# Patient Record
Sex: MALE
[De-identification: names, ages, dates, MRNs, and addresses within clinical notes are randomized per-mention and may not be internally consistent; named-entity substitution may affect disease eponyms.]

---

## 2018-01-21 ENCOUNTER — HOSPITAL ENCOUNTER (INPATIENT)
Dept: HOSPITAL 92 - ERS | Age: 63
LOS: 2 days | Discharge: HOME | DRG: 247 | End: 2018-01-23
Attending: INTERNAL MEDICINE | Admitting: INTERNAL MEDICINE
Payer: COMMERCIAL

## 2018-01-21 VITALS — BODY MASS INDEX: 27.4 KG/M2

## 2018-01-21 DIAGNOSIS — I10: ICD-10-CM

## 2018-01-21 DIAGNOSIS — I48.91: ICD-10-CM

## 2018-01-21 DIAGNOSIS — F17.210: ICD-10-CM

## 2018-01-21 DIAGNOSIS — Z88.0: ICD-10-CM

## 2018-01-21 DIAGNOSIS — I25.10: ICD-10-CM

## 2018-01-21 DIAGNOSIS — I21.09: Primary | ICD-10-CM

## 2018-01-21 LAB
ALBUMIN SERPL BCG-MCNC: 3.8 G/DL (ref 3.5–5)
ALP SERPL-CCNC: 54 U/L (ref 40–150)
ALT SERPL W P-5'-P-CCNC: 16 U/L (ref 8–55)
ANION GAP SERPL CALC-SCNC: 14 MMOL/L (ref 10–20)
APTT PPP: (no result) SEC (ref 22.9–36.1)
AST SERPL-CCNC: 14 U/L (ref 5–34)
BASOPHILS # BLD AUTO: 0 THOU/UL (ref 0–0.2)
BASOPHILS NFR BLD AUTO: 0.4 % (ref 0–1)
BILIRUB SERPL-MCNC: 0.6 MG/DL (ref 0.2–1.2)
BUN SERPL-MCNC: 15 MG/DL (ref 8.4–25.7)
CALCIUM SERPL-MCNC: 8.5 MG/DL (ref 7.8–10.44)
CHLORIDE SERPL-SCNC: 98 MMOL/L (ref 98–107)
CK MB SERPL-MCNC: 12.1 NG/ML (ref 0–6.6)
CK MB SERPL-MCNC: 3.8 NG/ML (ref 0–6.6)
CK MB SERPL-MCNC: 35 NG/ML (ref 0–6.6)
CO2 SERPL-SCNC: 20 MMOL/L (ref 22–29)
CREAT CL PREDICTED SERPL C-G-VRATE: 0 ML/MIN (ref 70–130)
EOSINOPHIL # BLD AUTO: 0.2 THOU/UL (ref 0–0.7)
EOSINOPHIL NFR BLD AUTO: 3.1 % (ref 0–10)
GLOBULIN SER CALC-MCNC: 2.2 G/DL (ref 2.4–3.5)
GLUCOSE SERPL-MCNC: 156 MG/DL (ref 70–105)
HGB BLD-MCNC: 14.7 G/DL (ref 14–18)
INR PPP: 1.2
LYMPHOCYTES # BLD: 1.5 THOU/UL (ref 1.2–3.4)
LYMPHOCYTES NFR BLD AUTO: 20.4 % (ref 21–51)
MCH RBC QN AUTO: 29.2 PG (ref 27–31)
MCV RBC AUTO: 87.8 FL (ref 80–94)
MONOCYTES # BLD AUTO: 0.6 THOU/UL (ref 0.11–0.59)
MONOCYTES NFR BLD AUTO: 8.4 % (ref 0–10)
NEUTROPHILS # BLD AUTO: 4.8 THOU/UL (ref 1.4–6.5)
NEUTROPHILS NFR BLD AUTO: 67.7 % (ref 42–75)
PLATELET # BLD AUTO: 178 THOU/UL (ref 130–400)
POTASSIUM SERPL-SCNC: 3.2 MMOL/L (ref 3.5–5.1)
PROTHROMBIN TIME: 15 SEC (ref 12–14.7)
RBC # BLD AUTO: 5.04 MILL/UL (ref 4.7–6.1)
SODIUM SERPL-SCNC: 129 MMOL/L (ref 136–145)
TROPONIN I SERPL DL<=0.01 NG/ML-MCNC: 0.05 NG/ML (ref ?–0.03)
TROPONIN I SERPL DL<=0.01 NG/ML-MCNC: 1.28 NG/ML (ref ?–0.03)
TROPONIN I SERPL DL<=0.01 NG/ML-MCNC: 3.8 NG/ML (ref ?–0.03)
WBC # BLD AUTO: 7.1 THOU/UL (ref 4.8–10.8)

## 2018-01-21 PROCEDURE — 4A023N7 MEASUREMENT OF CARDIAC SAMPLING AND PRESSURE, LEFT HEART, PERCUTANEOUS APPROACH: ICD-10-PCS | Performed by: INTERNAL MEDICINE

## 2018-01-21 PROCEDURE — 85025 COMPLETE CBC W/AUTO DIFF WBC: CPT

## 2018-01-21 PROCEDURE — 84481 FREE ASSAY (FT-3): CPT

## 2018-01-21 PROCEDURE — G0009 ADMIN PNEUMOCOCCAL VACCINE: HCPCS

## 2018-01-21 PROCEDURE — 83735 ASSAY OF MAGNESIUM: CPT

## 2018-01-21 PROCEDURE — 86900 BLOOD TYPING SEROLOGIC ABO: CPT

## 2018-01-21 PROCEDURE — 84439 ASSAY OF FREE THYROXINE: CPT

## 2018-01-21 PROCEDURE — B2151ZZ FLUOROSCOPY OF LEFT HEART USING LOW OSMOLAR CONTRAST: ICD-10-PCS | Performed by: INTERNAL MEDICINE

## 2018-01-21 PROCEDURE — C1769 GUIDE WIRE: HCPCS

## 2018-01-21 PROCEDURE — 93798 PHYS/QHP OP CAR RHAB W/ECG: CPT

## 2018-01-21 PROCEDURE — 80061 LIPID PANEL: CPT

## 2018-01-21 PROCEDURE — 99153 MOD SED SAME PHYS/QHP EA: CPT

## 2018-01-21 PROCEDURE — 90471 IMMUNIZATION ADMIN: CPT

## 2018-01-21 PROCEDURE — 85347 COAGULATION TIME ACTIVATED: CPT

## 2018-01-21 PROCEDURE — 93306 TTE W/DOPPLER COMPLETE: CPT

## 2018-01-21 PROCEDURE — 027034Z DILATION OF CORONARY ARTERY, ONE ARTERY WITH DRUG-ELUTING INTRALUMINAL DEVICE, PERCUTANEOUS APPROACH: ICD-10-PCS | Performed by: INTERNAL MEDICINE

## 2018-01-21 PROCEDURE — C9600 PERC DRUG-EL COR STENT SING: HCPCS

## 2018-01-21 PROCEDURE — 93010 ELECTROCARDIOGRAM REPORT: CPT

## 2018-01-21 PROCEDURE — 83880 ASSAY OF NATRIURETIC PEPTIDE: CPT

## 2018-01-21 PROCEDURE — B2111ZZ FLUOROSCOPY OF MULTIPLE CORONARY ARTERIES USING LOW OSMOLAR CONTRAST: ICD-10-PCS | Performed by: INTERNAL MEDICINE

## 2018-01-21 PROCEDURE — 92921: CPT

## 2018-01-21 PROCEDURE — 96374 THER/PROPH/DIAG INJ IV PUSH: CPT

## 2018-01-21 PROCEDURE — 90732 PPSV23 VACC 2 YRS+ SUBQ/IM: CPT

## 2018-01-21 PROCEDURE — 85730 THROMBOPLASTIN TIME PARTIAL: CPT

## 2018-01-21 PROCEDURE — 36415 COLL VENOUS BLD VENIPUNCTURE: CPT

## 2018-01-21 PROCEDURE — 86901 BLOOD TYPING SEROLOGIC RH(D): CPT

## 2018-01-21 PROCEDURE — 80053 COMPREHEN METABOLIC PANEL: CPT

## 2018-01-21 PROCEDURE — 82553 CREATINE MB FRACTION: CPT

## 2018-01-21 PROCEDURE — 85610 PROTHROMBIN TIME: CPT

## 2018-01-21 PROCEDURE — 86850 RBC ANTIBODY SCREEN: CPT

## 2018-01-21 PROCEDURE — 71045 X-RAY EXAM CHEST 1 VIEW: CPT

## 2018-01-21 PROCEDURE — 84443 ASSAY THYROID STIM HORMONE: CPT

## 2018-01-21 PROCEDURE — 93005 ELECTROCARDIOGRAM TRACING: CPT

## 2018-01-21 PROCEDURE — 92928 PRQ TCAT PLMT NTRAC ST 1 LES: CPT

## 2018-01-21 PROCEDURE — 84484 ASSAY OF TROPONIN QUANT: CPT

## 2018-01-21 PROCEDURE — 99152 MOD SED SAME PHYS/QHP 5/>YRS: CPT

## 2018-01-21 PROCEDURE — 93458 L HRT ARTERY/VENTRICLE ANGIO: CPT

## 2018-01-21 PROCEDURE — C1874 STENT, COATED/COV W/DEL SYS: HCPCS

## 2018-01-21 RX ADMIN — AMIODARONE HYDROCHLORIDE SCH MLS: 50 INJECTION, SOLUTION INTRAVENOUS at 15:56

## 2018-01-21 RX ADMIN — TICAGRELOR SCH MG: 90 TABLET ORAL at 20:52

## 2018-01-21 NOTE — RAD
SINGLE VIEW OF THE CHEST:

 

Comparison: 1-31-06

 

History: Status post interventional cardiology. Stent placement. 

 

FINDINGS:

Single view of the chest shows a normal sized cardiomediastinal silhouette. There is no evidence of c
onsolidation, mass, or pleural effusion. The bones are unremarkable.

 

IMPRESSION:

No evidence of acute cardiopulmonary disease.

 

POS: H

## 2018-01-21 NOTE — HP
DATE OF SERVICE:  2018

 

CHIEF COMPLAINT:  Chest pain, anterior MI.

 

HISTORY OF PRESENT ILLNESS:  Mr. Pedraza is a very pleasant 62-year-old  gentleman who comes to 
the hospital with chest pain.  He was at home taking a shower, came out of the shower.  He is getting
 ready to go to a  of his brother's friend and he developed sudden onset of chest tightness.  
He did not felt anything like this but he immediately called 911 and he was brought into the hospital
 immediately.  En route, an EKG was done and that showed anterior ST elevations, so the STEMI pager w
as activated and we were called emergently.  He states that by the time I saw him in the ER, he had b
een having pain for about 30 minutes at that point.  He was taken emergently to catheterization lab w
here he was found to have an occluded LAD at a bifurcation with a large diagonal.  We wired the LAD. 
 Balloon did open and restored flow.  His pain got better, still not completely gone, but better.  We
 then called Surgery as there was a possibility that we may need to vascularize him with open heart a
s he had a complex lesion in the proximal LAD; however, he continued to occlude the artery, so we dec
ided to go ahead and try to intervene with a stent.  We then were able to place a stent across the la
rge diagonal and we were able to balloon, open the ostium of the diagonal with decent results.  His p
ain was much better.  During all this, he was monitored.  He was in atrial fibrillation with rapid ve
ntricular response.  His blood pressure was high; however, when the pain was better controlled, his b
lood pressure went down.  His heart rate actually came down to the 90s-110s.  He did well.  He was st
arted on Aggrastat as well given his continued thrombus formation.  He also had distal embolization t
o a diagonal, which was product with a balloon; however, the distal portion of this diagonal was stil
l occluded towards the end of the procedure; it did not improve with adenosine.  He was not having an
y more pain towards the end.  He just had a discomfort.  He was transferred to the ICU.

 

PAST MEDICAL HISTORY:  None.

 

SOCIAL HISTORY:  He smokes.  He states about 2-4 cigarettes a day and he has been trying to quit for 
a long time.  No alcohol or drugs.

 

FAMILY HISTORY:  Noncontributory.

 

MEDICATIONS:  None per patient's report.

 

ALLERGIES:  PENICILLIN.

 

REVIEW OF SYSTEMS:  A 12-point review of system was done and is all negative unless stated in the his
tory of present illness.

 

PHYSICAL EXAMINATION:

VITAL SIGNS:  Temperature 97.2, pulse 118, respiratory rate 20, satting 93% on room air and blood pre
ssure 124/77 on catheterization.

GENERAL:  Awake, alert and oriented x3, groaning and moaning while in pain, better now.  He has calme
d down.

HEENT:  Normocephalic and atraumatic.

NECK:  Supple.

LUNGS:  Clear.

CARDIOVASCULAR:  S1 and S2.  No S3, S4.  No murmurs or rubs.

ABDOMEN:  Soft.  Positive bowel sounds.

EXTREMITIES:  No edema.

SKIN:  Warm and dry.

 

LABORATORY WORK:  Reviewed a sodium of 129, potassium 3.2, chloride of 98, carbon dioxide of 20, anio
n gap of 14, BUN of 15, creatinine is 0.82, GFR greater than 90, glucose of 156, calcium of 8.5, tota
l bilirubin 0.6.  AST, ALT, and alkaline phosphatase are normal.  CK-MB initially at 3.8, troponin 0.
052, albumin of 3.8, globulin 2.2.  CBC with a white count 7.1, hemoglobin 14, hematocrit 44 and plat
elet count 178.

 

IMAGING DATA:  EKG has reviewed anterior ST elevations.

 

ASSESSMENT:

1.  Acute anterior ST elevation myocardial infarction.

2.  Atrial fibrillation and rapid ventricular response.

3.  Hypertension.

4.  Tobacco abuse.

 

PLAN:

1.  Admit to ICU.

2.  Continue Aggrastat for more hour after intervention and then hold.  We will plan on pulling the s
summer about an hour after that as long as the ACT is also under 200 as well.

3.  Dual antiplatelet therapy with Brilinta and aspirin for minimum of one year .  Denies prophylaxis
.

4.  High dose statin 80 mg of Lipitor day.

5.  PPI for stress ulcer prophylaxis.

6.  FULL CODE.

7.  Disposition:  Pending clinical evolution.

## 2018-01-22 LAB
ALBUMIN SERPL BCG-MCNC: 3.8 G/DL (ref 3.5–5)
ALP SERPL-CCNC: 55 U/L (ref 40–150)
ALT SERPL W P-5'-P-CCNC: 21 U/L (ref 8–55)
ANION GAP SERPL CALC-SCNC: 13 MMOL/L (ref 10–20)
AST SERPL-CCNC: 48 U/L (ref 5–34)
BASOPHILS # BLD AUTO: 0 THOU/UL (ref 0–0.2)
BASOPHILS NFR BLD AUTO: 0.5 % (ref 0–1)
BILIRUB SERPL-MCNC: 0.6 MG/DL (ref 0.2–1.2)
BUN SERPL-MCNC: 11 MG/DL (ref 8.4–25.7)
CALCIUM SERPL-MCNC: 8.9 MG/DL (ref 7.8–10.44)
CHD RISK SERPL-RTO: 3.4 (ref ?–4.5)
CHLORIDE SERPL-SCNC: 109 MMOL/L (ref 98–107)
CHOLEST SERPL-MCNC: 198 MG/DL
CO2 SERPL-SCNC: 20 MMOL/L (ref 22–29)
CREAT CL PREDICTED SERPL C-G-VRATE: 118 ML/MIN (ref 70–130)
EOSINOPHIL # BLD AUTO: 0.1 THOU/UL (ref 0–0.7)
EOSINOPHIL NFR BLD AUTO: 1.3 % (ref 0–10)
GLOBULIN SER CALC-MCNC: 2.3 G/DL (ref 2.4–3.5)
GLUCOSE SERPL-MCNC: 106 MG/DL (ref 70–105)
HDLC SERPL-MCNC: 58 MG/DL
HGB BLD-MCNC: 14.9 G/DL (ref 14–18)
LDLC SERPL CALC-MCNC: 108 MG/DL
LYMPHOCYTES # BLD: 1.4 THOU/UL (ref 1.2–3.4)
LYMPHOCYTES NFR BLD AUTO: 14.6 % (ref 21–51)
MCH RBC QN AUTO: 29.7 PG (ref 27–31)
MCV RBC AUTO: 88 FL (ref 80–94)
MONOCYTES # BLD AUTO: 0.9 THOU/UL (ref 0.11–0.59)
MONOCYTES NFR BLD AUTO: 9.1 % (ref 0–10)
NEUTROPHILS # BLD AUTO: 7.1 THOU/UL (ref 1.4–6.5)
NEUTROPHILS NFR BLD AUTO: 74.5 % (ref 42–75)
PLATELET # BLD AUTO: 192 THOU/UL (ref 130–400)
POTASSIUM SERPL-SCNC: 4.2 MMOL/L (ref 3.5–5.1)
RBC # BLD AUTO: 5 MILL/UL (ref 4.7–6.1)
SODIUM SERPL-SCNC: 138 MMOL/L (ref 136–145)
T4 FREE SERPL-MCNC: 0.91 NG/DL (ref 0.7–1.48)
TRIGL SERPL-MCNC: 162 MG/DL (ref ?–150)
TSH SERPL DL<=0.005 MIU/L-ACNC: 3.3 UIU/ML (ref 0.35–4.94)
WBC # BLD AUTO: 9.5 THOU/UL (ref 4.8–10.8)

## 2018-01-22 RX ADMIN — AMIODARONE HYDROCHLORIDE SCH MLS: 50 INJECTION, SOLUTION INTRAVENOUS at 16:47

## 2018-01-22 RX ADMIN — TICAGRELOR SCH MG: 90 TABLET ORAL at 09:06

## 2018-01-22 NOTE — PDOC.CTH
Cardiology Progress Note





- Objective


 Vital Signs











  Temp Pulse Pulse Pulse Resp BP BP


 


 01/22/18 11:00  97.6 F      


 


 01/22/18 09:09    105 H  88   142/78 H  149/74 H


 


 01/22/18 08:00  97.5 F L  80    18  


 


 01/22/18 07:00  97.5 F L      


 


 01/22/18 04:00  98.1 F      














  Pulse Ox Pulse Ox Pulse Ox


 


 01/22/18 11:00   


 


 01/22/18 09:09   99  100


 


 01/22/18 08:00  98  


 


 01/22/18 07:00   


 


 01/22/18 04:00   











 











 01/21/18 01/22/18 01/23/18





 06:59 06:59 06:59


 


Intake Total  1972.4 710


 


Output Total  3200 1020


 


Balance  -1227.6 -310














- Physical Examination


General/Neuro: alert & oriented x3, NAD


Neck: no JVD present


Lungs: CTA, unlabored respirations


Heart: other: (Irreg)


Abdomen: NT/ND


Extremities: other: (no edema.)





- Telemetry


Telemetry Rhythm: Afib HR 





- Labs


Result Diagrams: 


 01/22/18 05:23





 01/22/18 05:23


 Troponin/CKMB











CK-MB (CK-2)  35.0 ng/mL (0-6.6)  H*  01/21/18  18:54    


 


Troponin I  3.801 ng/mL (< 0.028)  H*  01/21/18  18:54    














- Assessment/Plan





1/. Acute naterior STEMI


2. S/P PCI to LAD TYRELL


3. Afib RVR


4. TObacco use. 





PLAN:


- Continue JAYLIN, will switch brilinta to Plavix.


- Will start Eliquis for stroke prophylaxis


- Will line up for SUNDAY Cardioversion tomorrow morning if he remains in afib. 


- Will restart amiodarone drip as his HR remains in the 130's.

## 2018-01-23 VITALS — SYSTOLIC BLOOD PRESSURE: 137 MMHG | DIASTOLIC BLOOD PRESSURE: 70 MMHG | TEMPERATURE: 97.3 F

## 2018-01-23 LAB
ALBUMIN SERPL BCG-MCNC: 3.9 G/DL (ref 3.5–5)
ALP SERPL-CCNC: 57 U/L (ref 40–150)
ALT SERPL W P-5'-P-CCNC: 20 U/L (ref 8–55)
ANION GAP SERPL CALC-SCNC: 13 MMOL/L (ref 10–20)
AST SERPL-CCNC: 31 U/L (ref 5–34)
BASOPHILS # BLD AUTO: 0.1 THOU/UL (ref 0–0.2)
BASOPHILS NFR BLD AUTO: 0.6 % (ref 0–1)
BILIRUB SERPL-MCNC: 0.8 MG/DL (ref 0.2–1.2)
BUN SERPL-MCNC: 12 MG/DL (ref 8.4–25.7)
CALCIUM SERPL-MCNC: 9.2 MG/DL (ref 7.8–10.44)
CHLORIDE SERPL-SCNC: 105 MMOL/L (ref 98–107)
CO2 SERPL-SCNC: 22 MMOL/L (ref 22–29)
CREAT CL PREDICTED SERPL C-G-VRATE: 105 ML/MIN (ref 70–130)
EOSINOPHIL # BLD AUTO: 0.3 THOU/UL (ref 0–0.7)
EOSINOPHIL NFR BLD AUTO: 2.7 % (ref 0–10)
GLOBULIN SER CALC-MCNC: 2.4 G/DL (ref 2.4–3.5)
GLUCOSE SERPL-MCNC: 110 MG/DL (ref 70–105)
HGB BLD-MCNC: 14.6 G/DL (ref 14–18)
LYMPHOCYTES # BLD: 1.6 THOU/UL (ref 1.2–3.4)
LYMPHOCYTES NFR BLD AUTO: 16.1 % (ref 21–51)
MCH RBC QN AUTO: 29.1 PG (ref 27–31)
MCV RBC AUTO: 88 FL (ref 80–94)
MONOCYTES # BLD AUTO: 1.1 THOU/UL (ref 0.11–0.59)
MONOCYTES NFR BLD AUTO: 10.9 % (ref 0–10)
NEUTROPHILS # BLD AUTO: 6.9 THOU/UL (ref 1.4–6.5)
NEUTROPHILS NFR BLD AUTO: 69.7 % (ref 42–75)
PLATELET # BLD AUTO: 170 THOU/UL (ref 130–400)
POTASSIUM SERPL-SCNC: 4.3 MMOL/L (ref 3.5–5.1)
RBC # BLD AUTO: 5 MILL/UL (ref 4.7–6.1)
SODIUM SERPL-SCNC: 136 MMOL/L (ref 136–145)
WBC # BLD AUTO: 9.9 THOU/UL (ref 4.8–10.8)

## 2018-01-23 NOTE — DIS
DATE OF ADMISSION:  01/21/2018

 

DATE OF DISCHARGE:  01/23/2018

 

DISCHARGING PHYSICIAN:  Chino Mcintyre M.D.

 

PRIMARY DIAGNOSES:

1.  Acute anterior ST elevation myocardial infarction.

2.  Hypertension.

3.  Tobacco abuse.

4.  Atrial fibrillation.

 

PROCEDURES PERFORMED:

1.  Echocardiogram.

2.  Left heart catheterization.

3.  Percutaneous coronary intervention to the LAD with a drug-eluting stent.

 

SUMMARY:  Mr. Pedraza is a very pleasant 62-year-old  gentleman who comes to the hospital for ev
aluation of chest pain.  He was diagnosed with acute anterior ST elevation myocardial infarction and 
taken emergently to the catheterization lab where he was found to have an occluded LAD which was wire
d and successfully stented with a drug-eluting stent.  We also stented side-branch diagonal through t
he stent struts with good results.  He tolerated the procedure well.  He was pain free.  He did come 
in atrial fibrillation with rapid ventricular response, most likely related to the MI.  He was starte
d on amiodarone drip and he eventually converted overnight.  An echocardiogram that showed normal LV 
function, eventually was doing very well so he was discharged home in stable condition.

 

DISCHARGE MEDICATIONS:  Include,

1.  Amiodarone 400 mg p.o. b.i.d. for 10 days and then down to 200 mg a day.

2.  Eliquis 5 mg p.o. b.i.d.

3.  Aspirin 81 daily.

4.  Lipitor 80 mg at bedtime.

5.  Plavix 75 mg a day.

6.  Metoprolol 12.5 mg b.i.d.

 

FOLLOWUP APPOINTMENTS:

1.  With myself in 2 weeks.

2.  Cardiac rehabilitation evaluation next week.

 

Over 30 minutes were spent at bedside counseling for discharge.

## 2018-01-23 NOTE — EKG
Test Reason : 

Blood Pressure : ***/*** mmHG

Vent. Rate : 084 BPM     Atrial Rate : 250 BPM

   P-R Int : 000 ms          QRS Dur : 088 ms

    QT Int : 432 ms       P-R-T Axes : 000 -22 -08 degrees

   QTc Int : 510 ms

 

Atrial fibrillation

T wave abnormality, consider anterolateral ischemia or digitalis effect

Prolonged QT

Abnormal ECG

When compared with ECG of 21-JAN-2018 15:29, (Unconfirmed)

Nonspecific T wave abnormality, worse in Inferior leads

T wave inversion now evident in Anterolateral leads

QT has lengthened

Confirmed by GOKUL ENGEL ([id number]) on 1/23/2018 11:34:21 PM

 

Referred By:  SARAH BETH           Confirmed By:GOKUL ENGEL

## 2018-01-24 NOTE — CON
DATE OF CONSULTATION:  01/21/2018

 

HISTORY OF PRESENT ILLNESS:  This is a 62-year-old gentleman with a heavy smoking history, who was br
ought to the emergency room by EMS after experiencing sudden onset of chest tightness.  He had initia
l ST segment elevations anteriorly.  His onset of pain to being in the cath lab when I contacted was 
probably about 1 hour.  Initial consultation was to take the patient to the operating room for emerge
ncy coronary bypass grafting.  The patient had had a totally occluded LAD and had been recanalized wi
th balloon angioplasty by Dr. Mcintyre.  Due to significant diagonal disease as well as some left PDA d
isease, it was felt that surgical intervention would be more appropriate.  Surgical crew was contacte
d as well as Anesthesia and the room was being set up on the patient abruptly occluded his LAD again.
  At that time, it was felt that percutaneous intervention, although not able to treat all his diseas
e would be more appropriate than the delay to revascularize him surgically.

 

PAST MEDICAL HISTORY:  Not able to be obtained.

 

Total time in cardiac cath lab spent dealing with this patient was 30 minutes.